# Patient Record
Sex: FEMALE | Race: ASIAN | NOT HISPANIC OR LATINO
[De-identification: names, ages, dates, MRNs, and addresses within clinical notes are randomized per-mention and may not be internally consistent; named-entity substitution may affect disease eponyms.]

---

## 2019-12-09 ENCOUNTER — APPOINTMENT (OUTPATIENT)
Dept: OBGYN | Facility: CLINIC | Age: 49
End: 2019-12-09
Payer: COMMERCIAL

## 2019-12-09 VITALS
HEIGHT: 62 IN | SYSTOLIC BLOOD PRESSURE: 142 MMHG | DIASTOLIC BLOOD PRESSURE: 78 MMHG | HEART RATE: 72 BPM | WEIGHT: 154 LBS | BODY MASS INDEX: 28.34 KG/M2

## 2019-12-09 DIAGNOSIS — N80.1 ENDOMETRIOSIS OF OVARY: ICD-10-CM

## 2019-12-09 DIAGNOSIS — Z63.4 DISAPPEARANCE AND DEATH OF FAMILY MEMBER: ICD-10-CM

## 2019-12-09 PROCEDURE — 99386 PREV VISIT NEW AGE 40-64: CPT

## 2019-12-09 RX ORDER — MULTIVITAMIN
CAPSULE ORAL
Refills: 0 | Status: ACTIVE | COMMUNITY

## 2019-12-09 SDOH — SOCIAL STABILITY - SOCIAL INSECURITY: DISSAPEARANCE AND DEATH OF FAMILY MEMBER: Z63.4

## 2019-12-09 NOTE — PHYSICAL EXAM
[Awake] : awake [Alert] : alert [Soft] : soft [Vulvar Atrophy] : vulvar atrophy [Oriented x3] : oriented to person, place, and time [Normal] : cervix [No Bleeding] : there was no active vaginal bleeding [Atrophy] : atrophy [Absent] : absent [Adnexa Absent] : absent bilaterally [Acute Distress] : no acute distress [Mass] : no breast mass [Tender] : non tender [Axillary LAD] : no axillary lymphadenopathy [Nipple Discharge] : no nipple discharge

## 2019-12-10 LAB — HPV HIGH+LOW RISK DNA PNL CVX: NOT DETECTED

## 2019-12-16 ENCOUNTER — ASOB RESULT (OUTPATIENT)
Age: 49
End: 2019-12-16

## 2019-12-16 ENCOUNTER — APPOINTMENT (OUTPATIENT)
Dept: OBGYN | Facility: CLINIC | Age: 49
End: 2019-12-16
Payer: COMMERCIAL

## 2019-12-16 PROCEDURE — 76830 TRANSVAGINAL US NON-OB: CPT

## 2019-12-17 LAB — CYTOLOGY CVX/VAG DOC THIN PREP: ABNORMAL

## 2019-12-27 ENCOUNTER — OTHER (OUTPATIENT)
Age: 49
End: 2019-12-27

## 2020-01-06 ENCOUNTER — APPOINTMENT (OUTPATIENT)
Dept: OBGYN | Facility: CLINIC | Age: 50
End: 2020-01-06

## 2020-06-12 ENCOUNTER — APPOINTMENT (OUTPATIENT)
Dept: ENDOCRINOLOGY | Facility: CLINIC | Age: 50
End: 2020-06-12
Payer: COMMERCIAL

## 2020-06-12 VITALS
HEIGHT: 62 IN | TEMPERATURE: 97.3 F | WEIGHT: 155 LBS | OXYGEN SATURATION: 98 % | BODY MASS INDEX: 28.52 KG/M2 | DIASTOLIC BLOOD PRESSURE: 70 MMHG | SYSTOLIC BLOOD PRESSURE: 120 MMHG | HEART RATE: 72 BPM

## 2020-06-12 DIAGNOSIS — Z87.19 PERSONAL HISTORY OF OTHER DISEASES OF THE DIGESTIVE SYSTEM: ICD-10-CM

## 2020-06-12 DIAGNOSIS — H40.9 UNSPECIFIED GLAUCOMA: ICD-10-CM

## 2020-06-12 DIAGNOSIS — G56.02 CARPAL TUNNEL SYNDROME, LEFT UPPER LIMB: ICD-10-CM

## 2020-06-12 PROCEDURE — 99244 OFF/OP CNSLTJ NEW/EST MOD 40: CPT

## 2020-06-12 RX ORDER — CARBOXYMETHYLCELLULOSE SODIUM 10 MG/ML
SOLUTION/ DROPS OPHTHALMIC
Refills: 0 | Status: ACTIVE | COMMUNITY

## 2020-06-12 RX ORDER — ASCORBIC ACID 500 MG
TABLET ORAL
Refills: 0 | Status: ACTIVE | COMMUNITY

## 2020-06-12 RX ORDER — COLD-HOT PACK
EACH MISCELLANEOUS
Refills: 0 | Status: ACTIVE | COMMUNITY

## 2020-06-12 NOTE — PHYSICAL EXAM
[Alert] : alert [Well Nourished] : well nourished [No Acute Distress] : no acute distress [Well Developed] : well developed [Normal Sclera/Conjunctiva] : normal sclera/conjunctiva [EOMI] : extra ocular movement intact [No Proptosis] : no proptosis [Normal Oropharynx] : the oropharynx was normal [Thyroid Not Enlarged] : the thyroid was not enlarged [No Respiratory Distress] : no respiratory distress [No Thyroid Nodules] : no palpable thyroid nodules [Clear to Auscultation] : lungs were clear to auscultation bilaterally [No Accessory Muscle Use] : no accessory muscle use [Normal S1, S2] : normal S1 and S2 [Regular Rhythm] : with a regular rhythm [Normal Rate] : heart rate was normal [Pedal Pulses Normal] : the pedal pulses are present [No Edema] : no peripheral edema [Normal Bowel Sounds] : normal bowel sounds [Not Tender] : non-tender [Soft] : abdomen soft [Not Distended] : not distended [No Spinal Tenderness] : no spinal tenderness [Normal Anterior Cervical Nodes] : no anterior cervical lymphadenopathy [Normal Posterior Cervical Nodes] : no posterior cervical lymphadenopathy [Spine Straight] : spine straight [No Stigmata of Cushings Syndrome] : no stigmata of Cushings Syndrome [No Rash] : no rash [Normal Strength/Tone] : muscle strength and tone were normal [Normal Gait] : normal gait [Acanthosis Nigricans] : no acanthosis nigricans [Normal Reflexes] : deep tendon reflexes were 2+ and symmetric [No Tremors] : no tremors [Oriented x3] : oriented to person, place, and time [de-identified] : wearing left wrist brace

## 2020-06-12 NOTE — REASON FOR VISIT
[Consultation] : a consultation visit [Osteoporosis] : osteoporosis [FreeTextEntry2] : Lady Grissom MD

## 2020-06-12 NOTE — HISTORY OF PRESENT ILLNESS
[Premat. Menopause (surgery)] : a history of premature surgical menopause [Family History of Breast Cancer] : family history of breast cancer [Disordered Eating] : no history of disordered eating [Taking Steroids] : no history of taking steroids [Hyperparathyroidism] : no history of hyperparathyroidism [Diabetes] : no history of diabetes [Kidney Stones] : no history of kidney stones [Previous Fragility Fracture] : no previous fragility fracture [Regular Dental Follow-Up] : regular dental follow-up [Family History of Hip Fracture] : no family history of hip fracture [Family History of Osteoporosis] : no family history of osteoporosis [History of Radiation Therapy] : no history of radiation therapy [History of Blood Clots] : no history of blood clots [High Fall Risk] : no fall risk [FreeTextEntry1] : c [de-identified] : cousins breast cancer

## 2020-06-12 NOTE — CONSULT LETTER
[Dear  ___] : Dear  [unfilled], [Consult Letter:] : I had the pleasure of evaluating your patient, [unfilled]. [Please see my note below.] : Please see my note below. [Consult Closing:] : Thank you very much for allowing me to participate in the care of this patient.  If you have any questions, please do not hesitate to contact me. [Sincerely,] : Sincerely, [FreeTextEntry2] : Lady Grissom MD

## 2020-06-12 NOTE — PAST MEDICAL HISTORY
[Surgical Menopause] : The patient is in surgical menopause [Menopause Age____] : age at menopause was [unfilled] [History of Hormone Replacement Treatment] : has no history of hormone replacement treatment [de-identified] : mild hot flashes

## 2020-06-12 NOTE — ASSESSMENT
[FreeTextEntry1] : 50-year-old female presents with concerns about osteoporosis due to early surgical menopause.  The patient has fairly average bone density, consistent with osteopenia only in the spine.  I do not believe this patient is at high risk for osteoporosis or fractures.  Her current back pain is presumably due to other causes.\par I have recommend that she reduce calcium to 500 mg/day in addition to vitamin D 1000 units/day.  I would repeat bone density in 3 to 5 years.

## 2021-04-23 ENCOUNTER — APPOINTMENT (OUTPATIENT)
Dept: OBGYN | Facility: CLINIC | Age: 51
End: 2021-04-23
Payer: COMMERCIAL

## 2021-04-23 VITALS — WEIGHT: 155 LBS | BODY MASS INDEX: 28.52 KG/M2 | HEART RATE: 92 BPM | HEIGHT: 62 IN

## 2021-04-23 PROCEDURE — 99072 ADDL SUPL MATRL&STAF TM PHE: CPT

## 2021-04-23 PROCEDURE — 99396 PREV VISIT EST AGE 40-64: CPT

## 2021-04-23 NOTE — DISCUSSION/SUMMARY
[FreeTextEntry1] : Discussed vagina estrogen and hormone replacement with patient regarding symptoms, declined. \par

## 2021-04-23 NOTE — HISTORY OF PRESENT ILLNESS
[FreeTextEntry1] : 51 y/o F s/p YOLI/BSO (endometrioma) here for [employer]. Has vaginal dryness and some decreased libido. Using lubrication which sometimes help. Denies bleeding. \par \par Breast MRI scheduled for 4/30 due to cysts found on breast on mammogram/breast ultrasound (4/12) Will send results  [Mammogramdate] : 2021 [TextBox_19] : has MRI scheduled 4/30 [BreastSonogramDate] : 2021 [PapSmeardate] : 2019 [BoneDensityDate] : 2019

## 2021-04-29 LAB
CYTOLOGY CVX/VAG DOC THIN PREP: ABNORMAL
HPV HIGH+LOW RISK DNA PNL CVX: NOT DETECTED

## 2021-05-06 ENCOUNTER — NON-APPOINTMENT (OUTPATIENT)
Age: 51
End: 2021-05-06

## 2021-06-30 ENCOUNTER — NON-APPOINTMENT (OUTPATIENT)
Age: 51
End: 2021-06-30

## 2021-06-30 ENCOUNTER — APPOINTMENT (OUTPATIENT)
Dept: BREAST CENTER | Facility: CLINIC | Age: 51
End: 2021-06-30
Payer: COMMERCIAL

## 2021-06-30 VITALS
HEART RATE: 98 BPM | WEIGHT: 160 LBS | DIASTOLIC BLOOD PRESSURE: 70 MMHG | BODY MASS INDEX: 29.44 KG/M2 | SYSTOLIC BLOOD PRESSURE: 135 MMHG | HEIGHT: 62 IN

## 2021-06-30 DIAGNOSIS — Z91.09 OTHER ALLERGY STATUS, OTHER THAN TO DRUGS AND BIOLOGICAL SUBSTANCES: ICD-10-CM

## 2021-06-30 DIAGNOSIS — Z86.2 PERSONAL HISTORY OF DISEASES OF THE BLOOD AND BLOOD-FORMING ORGANS AND CERTAIN DISORDERS INVOLVING THE IMMUNE MECHANISM: ICD-10-CM

## 2021-06-30 PROCEDURE — 99072 ADDL SUPL MATRL&STAF TM PHE: CPT

## 2021-06-30 PROCEDURE — 99204 OFFICE O/P NEW MOD 45 MIN: CPT

## 2021-06-30 NOTE — PHYSICAL EXAM
[Normocephalic] : normocephalic [Atraumatic] : atraumatic [Supple] : supple [No Supraclavicular Adenopathy] : no supraclavicular adenopathy [No Thyromegaly] : no thyromegaly [No Cervical Adenopathy] : no cervical adenopathy [Normal Sinus Rhythm] : normal sinus rhythm [Examined in the supine and seated position] : examined in the supine and seated position [No dominant masses] : no dominant masses in right breast  [No dominant masses] : no dominant masses left breast [No Nipple Retraction] : no left nipple retraction [No Nipple Discharge] : no left nipple discharge [No Axillary Lymphadenopathy] : no left axillary lymphadenopathy [No Edema] : no edema [No Rashes] : no rashes [No Ulceration] : no ulceration

## 2021-06-30 NOTE — HISTORY OF PRESENT ILLNESS
[FreeTextEntry1] : Pt w/ L Br distortion detected on Scr Mammo (3/19/21)\par Mammo (3/19/21): FG, +poss distortion L central Br, +Bx clips L Br > add/l views rec'ed\par L add'l Mammo/L targeted Sono (4/12/21): +persistent distortion L inner Br on CC view only, No sono correlate, +5mm jacy ap nodule L 5:00 > L F/U Sono rec'ed\par MRI (4/30/21): NSF\par +FH Br Ca (M. Aunt 50's)\par S/P L Stereot Bx (2020): Benign\par No other Breast Surgery, Breast Procedures or Nipple Discharge. \par No FH Ovarian, Pancreatic Cancer or Melanoma. \par \par

## 2021-06-30 NOTE — REVIEW OF SYSTEMS
[Recent Weight Gain (___ Lbs)] : recent [unfilled] ~Ulb weight gain [Palpitations] : palpitations [Easy Bruising] : a tendency for easy bruising [Negative] : Heme/Lymph

## 2021-11-29 ENCOUNTER — APPOINTMENT (OUTPATIENT)
Dept: BREAST CENTER | Facility: CLINIC | Age: 51
End: 2021-11-29
Payer: COMMERCIAL

## 2021-11-29 DIAGNOSIS — Z12.31 ENCOUNTER FOR SCREENING MAMMOGRAM FOR MALIGNANT NEOPLASM OF BREAST: ICD-10-CM

## 2021-11-29 PROCEDURE — 99214 OFFICE O/P EST MOD 30 MIN: CPT

## 2021-11-29 NOTE — HISTORY OF PRESENT ILLNESS
[FreeTextEntry1] : Pt w/ L Br distortion detected on Scr Mammo (3/19/21)\par Mammo (3/19/21): FG, +poss distortion L central Br, +Bx clips L Br > add/l views rec'ed\par L add'l Mammo/L targeted Sono (4/12/21): +persistent distortion L inner Br on CC view only, No sono correlate, +5mm jacy ap nodule L 5:00 > L F/U Sono rec'ed\par MRI (4/30/21): NSF\par +FH Br Ca (M. Aunt 50's)\par S/P L Stereot Bx (2020): Benign\par Got second Moderna (1/22)\par No other Breast Surgery, Breast Procedures or Nipple Discharge. \par No FH Ovarian, Pancreatic Cancer or Melanoma. \par L F/U Mammo/L Sono (11/5/21): L med distortion less evident > L F/U dx'ic Mammo/L dx'ic Sono (5/22) rec'ed\par \par

## 2021-11-29 NOTE — REVIEW OF SYSTEMS
[Recent Weight Gain (___ Lbs)] : recent [unfilled] ~Ulb weight gain [Arthralgias] : arthralgias [Easy Bleeding] : a tendency for easy bleeding [Negative] : Endocrine

## 2022-10-11 ENCOUNTER — NON-APPOINTMENT (OUTPATIENT)
Age: 52
End: 2022-10-11

## 2022-10-13 ENCOUNTER — APPOINTMENT (OUTPATIENT)
Dept: OBGYN | Facility: CLINIC | Age: 52
End: 2022-10-13

## 2022-10-13 VITALS
HEIGHT: 62 IN | WEIGHT: 159 LBS | HEART RATE: 88 BPM | BODY MASS INDEX: 29.26 KG/M2 | SYSTOLIC BLOOD PRESSURE: 118 MMHG | DIASTOLIC BLOOD PRESSURE: 76 MMHG

## 2022-10-13 DIAGNOSIS — R10.32 LEFT LOWER QUADRANT PAIN: ICD-10-CM

## 2022-10-13 PROCEDURE — 99396 PREV VISIT EST AGE 40-64: CPT

## 2022-11-14 ENCOUNTER — APPOINTMENT (OUTPATIENT)
Dept: BREAST CENTER | Facility: CLINIC | Age: 52
End: 2022-11-14

## 2022-11-14 VITALS
WEIGHT: 160 LBS | SYSTOLIC BLOOD PRESSURE: 131 MMHG | HEIGHT: 62 IN | HEART RATE: 74 BPM | BODY MASS INDEX: 29.44 KG/M2 | DIASTOLIC BLOOD PRESSURE: 79 MMHG

## 2022-11-14 DIAGNOSIS — R92.8 OTHER ABNORMAL AND INCONCLUSIVE FINDINGS ON DIAGNOSTIC IMAGING OF BREAST: ICD-10-CM

## 2022-11-14 DIAGNOSIS — M85.80 OTHER SPECIFIED DISORDERS OF BONE DENSITY AND STRUCTURE, UNSPECIFIED SITE: ICD-10-CM

## 2022-11-14 DIAGNOSIS — R92.2 INCONCLUSIVE MAMMOGRAM: ICD-10-CM

## 2022-11-14 PROCEDURE — 99214 OFFICE O/P EST MOD 30 MIN: CPT

## 2022-11-14 RX ORDER — LOSARTAN POTASSIUM 50 MG/1
50 TABLET, FILM COATED ORAL
Refills: 0 | Status: ACTIVE | COMMUNITY

## 2022-11-14 NOTE — HISTORY OF PRESENT ILLNESS
[FreeTextEntry1] : Pt w/ L Br distortion detected on Scr Mammo (3/19/21)\par Mammo (3/19/21): FG, +poss distortion L central Br, +Bx clips L Br > add/l views rec'ed\par L add'l Mammo/L targeted Sono (4/12/21): +persistent distortion L inner Br on CC view only, No sono correlate, +5mm jacy ap nodule L 5:00 > L F/U Sono rec'ed\par MRI (4/30/21): NSF\par +FH Br Ca (M. Aunt 50's)\par S/P L Stereot Bx (2020): Benign\par Had COVID (4/22) > recovered\par Got second Moderna (1/22)\par No other Breast Surgery, Breast Procedures or Nipple Discharge. \par No FH Ovarian, Pancreatic Cancer or Melanoma.\par L F/U Mammo/L targetd Sono (7/22/22): HD, NSF\par L F/U Mammo/L Sono (11/5/21): L med distortion less evident > L F/U dx'ic Mammo/L dx'ic Sono (5/22) rec'ed\par \par

## 2022-11-14 NOTE — PHYSICAL EXAM
[Normocephalic] : normocephalic [Atraumatic] : atraumatic [Supple] : supple [No Supraclavicular Adenopathy] : no supraclavicular adenopathy [No Cervical Adenopathy] : no cervical adenopathy [No Thyromegaly] : no thyromegaly [Normal Sinus Rhythm] : normal sinus rhythm [Examined in the supine and seated position] : examined in the supine and seated position [No dominant masses] : no dominant masses in right breast  [No dominant masses] : no dominant masses left breast [No Nipple Retraction] : no left nipple retraction [No Nipple Discharge] : no left nipple discharge [No Axillary Lymphadenopathy] : no left axillary lymphadenopathy [No Edema] : no edema [No Rashes] : no rashes [No Ulceration] : no ulceration [de-identified] : +FC tissue\par NSF [de-identified] : +FC tissue\par NSF

## 2023-04-22 ENCOUNTER — OFFICE (OUTPATIENT)
Dept: URBAN - METROPOLITAN AREA CLINIC 70 | Facility: CLINIC | Age: 53
Setting detail: OPHTHALMOLOGY
End: 2023-04-22
Payer: COMMERCIAL

## 2023-04-22 DIAGNOSIS — H40.033: ICD-10-CM

## 2023-04-22 DIAGNOSIS — H16.223: ICD-10-CM

## 2023-04-22 DIAGNOSIS — H40.013: ICD-10-CM

## 2023-04-22 DIAGNOSIS — H25.13: ICD-10-CM

## 2023-04-22 DIAGNOSIS — H35.54: ICD-10-CM

## 2023-04-22 PROCEDURE — 92014 COMPRE OPH EXAM EST PT 1/>: CPT | Performed by: OPHTHALMOLOGY

## 2023-04-22 PROCEDURE — 92083 EXTENDED VISUAL FIELD XM: CPT | Performed by: OPHTHALMOLOGY

## 2023-04-22 PROCEDURE — 92133 CPTRZD OPH DX IMG PST SGM ON: CPT | Performed by: OPHTHALMOLOGY

## 2023-04-22 ASSESSMENT — REFRACTION_CURRENTRX
OS_CYLINDER: -0.50
OS_ADD: +1.75
OS_SPHERE: +1.00
OD_SPHERE: +0.50
OD_OVR_VA: 20/
OD_ADD: +1.75
OD_OVR_VA: 20/
OD_SPHERE: +0.50
OD_CYLINDER: -0.50
OS_AXIS: 129
OS_SPHERE: -0.50
OS_ADD: +1.25
OD_ADD: +1.25
OD_SPHERE: -0.50
OS_OVR_VA: 20/
OD_CYLINDER: -0.50
OS_OVR_VA: 20/
OS_SPHERE: PL
OS_AXIS: 134
OS_OVR_VA: 20/
OS_CYLINDER: -0.50
OD_AXIS: 14
OD_OVR_VA: 20/
OD_CYLINDER: -0.50
OD_AXIS: 180
OD_AXIS: 013

## 2023-04-22 ASSESSMENT — REFRACTION_AUTOREFRACTION
OS_SPHERE: +0.25
OD_CYLINDER: -0.75
OD_AXIS: 003
OD_SPHERE: +0.25
OS_AXIS: 117
OS_CYLINDER: -0.25

## 2023-04-22 ASSESSMENT — KERATOMETRY
OS_K1POWER_DIOPTERS: 42.50
OS_K2POWER_DIOPTERS: 43.00
OD_K1POWER_DIOPTERS: 42.25
OS_AXISANGLE_DEGREES: 085
OD_K2POWER_DIOPTERS: 43.50
OD_AXISANGLE_DEGREES: 090

## 2023-04-22 ASSESSMENT — VISUAL ACUITY
OD_BCVA: 20/25
OS_BCVA: 20/25

## 2023-04-22 ASSESSMENT — SPHEQUIV_DERIVED
OS_SPHEQUIV: 0.125
OS_SPHEQUIV: -0.375
OD_SPHEQUIV: -0.125

## 2023-04-22 ASSESSMENT — REFRACTION_MANIFEST
OD_ADD: +2.25
OD_VA1: 20/20
OD_SPHERE: PL
OS_VA1: 20/0
OD_AXIS: 180
OS_ADD: +2.25
OS_CYLINDER: -0.25
OU_VA: 20/20
OD_CYLINDER: -0.75
OS_SPHERE: -0.25
OS_AXIS: 130

## 2023-04-22 ASSESSMENT — AXIALLENGTH_DERIVED
OS_AL: 24.0212
OD_AL: 23.8735
OS_AL: 23.8209

## 2023-10-18 ENCOUNTER — APPOINTMENT (OUTPATIENT)
Dept: OBGYN | Facility: CLINIC | Age: 53
End: 2023-10-18
Payer: COMMERCIAL

## 2023-10-18 VITALS
HEIGHT: 62 IN | DIASTOLIC BLOOD PRESSURE: 82 MMHG | HEART RATE: 78 BPM | WEIGHT: 162 LBS | SYSTOLIC BLOOD PRESSURE: 138 MMHG | BODY MASS INDEX: 29.81 KG/M2

## 2023-10-18 DIAGNOSIS — Z01.419 ENCOUNTER FOR GYNECOLOGICAL EXAMINATION (GENERAL) (ROUTINE) W/OUT ABNORMAL FINDINGS: ICD-10-CM

## 2023-10-18 DIAGNOSIS — N95.2 POSTMENOPAUSAL ATROPHIC VAGINITIS: ICD-10-CM

## 2023-10-18 PROCEDURE — 99396 PREV VISIT EST AGE 40-64: CPT

## 2023-11-10 ENCOUNTER — TRANSCRIPTION ENCOUNTER (OUTPATIENT)
Age: 53
End: 2023-11-10

## 2023-11-12 ENCOUNTER — NON-APPOINTMENT (OUTPATIENT)
Age: 53
End: 2023-11-12

## 2023-11-13 ENCOUNTER — APPOINTMENT (OUTPATIENT)
Dept: BREAST CENTER | Facility: CLINIC | Age: 53
End: 2023-11-13
Payer: COMMERCIAL

## 2023-11-13 VITALS — DIASTOLIC BLOOD PRESSURE: 85 MMHG | HEART RATE: 82 BPM | SYSTOLIC BLOOD PRESSURE: 149 MMHG

## 2023-11-13 DIAGNOSIS — Z80.3 FAMILY HISTORY OF MALIGNANT NEOPLASM OF BREAST: ICD-10-CM

## 2023-11-13 DIAGNOSIS — N60.19 DIFFUSE CYSTIC MASTOPATHY OF UNSPECIFIED BREAST: ICD-10-CM

## 2023-11-13 DIAGNOSIS — Z86.16 PERSONAL HISTORY OF COVID-19: ICD-10-CM

## 2023-11-13 LAB
CYTOLOGY CVX/VAG DOC THIN PREP: ABNORMAL
HPV HIGH+LOW RISK DNA PNL CVX: NOT DETECTED

## 2023-11-13 PROCEDURE — 99213 OFFICE O/P EST LOW 20 MIN: CPT

## 2024-08-03 ENCOUNTER — RX ONLY (RX ONLY)
Age: 54
End: 2024-08-03

## 2024-08-03 ENCOUNTER — OFFICE (OUTPATIENT)
Dept: URBAN - METROPOLITAN AREA CLINIC 70 | Facility: CLINIC | Age: 54
Setting detail: OPHTHALMOLOGY
End: 2024-08-03
Payer: COMMERCIAL

## 2024-08-03 DIAGNOSIS — H40.013: ICD-10-CM

## 2024-08-03 DIAGNOSIS — H16.223: ICD-10-CM

## 2024-08-03 DIAGNOSIS — H25.13: ICD-10-CM

## 2024-08-03 DIAGNOSIS — H35.54: ICD-10-CM

## 2024-08-03 DIAGNOSIS — H40.033: ICD-10-CM

## 2024-08-03 PROCEDURE — 92133 CPTRZD OPH DX IMG PST SGM ON: CPT | Performed by: OPHTHALMOLOGY

## 2024-08-03 PROCEDURE — 92014 COMPRE OPH EXAM EST PT 1/>: CPT | Performed by: OPHTHALMOLOGY

## 2024-08-03 ASSESSMENT — CONFRONTATIONAL VISUAL FIELD TEST (CVF)
OS_FINDINGS: FULL
OD_FINDINGS: FULL

## 2024-10-24 ENCOUNTER — APPOINTMENT (OUTPATIENT)
Dept: OBGYN | Facility: CLINIC | Age: 54
End: 2024-10-24

## 2024-12-09 ENCOUNTER — APPOINTMENT (OUTPATIENT)
Dept: BREAST CENTER | Facility: CLINIC | Age: 54
End: 2024-12-09
Payer: COMMERCIAL

## 2024-12-09 VITALS — DIASTOLIC BLOOD PRESSURE: 79 MMHG | SYSTOLIC BLOOD PRESSURE: 135 MMHG | HEIGHT: 62 IN | HEART RATE: 75 BPM

## 2024-12-09 DIAGNOSIS — Z86.16 PERSONAL HISTORY OF COVID-19: ICD-10-CM

## 2024-12-09 DIAGNOSIS — Z80.3 FAMILY HISTORY OF MALIGNANT NEOPLASM OF BREAST: ICD-10-CM

## 2024-12-09 DIAGNOSIS — N60.19 DIFFUSE CYSTIC MASTOPATHY OF UNSPECIFIED BREAST: ICD-10-CM

## 2024-12-09 DIAGNOSIS — M85.80 OTHER SPECIFIED DISORDERS OF BONE DENSITY AND STRUCTURE, UNSPECIFIED SITE: ICD-10-CM

## 2024-12-09 PROCEDURE — 99213 OFFICE O/P EST LOW 20 MIN: CPT
